# Patient Record
Sex: MALE | Race: OTHER | NOT HISPANIC OR LATINO | Employment: FULL TIME | URBAN - METROPOLITAN AREA
[De-identification: names, ages, dates, MRNs, and addresses within clinical notes are randomized per-mention and may not be internally consistent; named-entity substitution may affect disease eponyms.]

---

## 2020-02-12 ENCOUNTER — OFFICE VISIT (OUTPATIENT)
Dept: URGENT CARE | Facility: MEDICAL CENTER | Age: 40
End: 2020-02-12
Payer: COMMERCIAL

## 2020-02-12 VITALS
WEIGHT: 140 LBS | SYSTOLIC BLOOD PRESSURE: 110 MMHG | DIASTOLIC BLOOD PRESSURE: 78 MMHG | TEMPERATURE: 101.7 F | RESPIRATION RATE: 14 BRPM | HEART RATE: 110 BPM | OXYGEN SATURATION: 97 %

## 2020-02-12 DIAGNOSIS — R68.89 FLU-LIKE SYMPTOMS: ICD-10-CM

## 2020-02-12 DIAGNOSIS — R50.9 FEVER, UNSPECIFIED FEVER CAUSE: ICD-10-CM

## 2020-02-12 DIAGNOSIS — J98.8 RTI (RESPIRATORY TRACT INFECTION): ICD-10-CM

## 2020-02-12 LAB
FLUAV+FLUBV AG SPEC QL IA: NORMAL
INT CON NEG: NORMAL
INT CON POS: NORMAL

## 2020-02-12 PROCEDURE — 87804 INFLUENZA ASSAY W/OPTIC: CPT | Performed by: NURSE PRACTITIONER

## 2020-02-12 PROCEDURE — 99204 OFFICE O/P NEW MOD 45 MIN: CPT | Performed by: NURSE PRACTITIONER

## 2020-02-12 RX ORDER — OSELTAMIVIR PHOSPHATE 75 MG/1
75 CAPSULE ORAL 2 TIMES DAILY
Qty: 10 CAP | Refills: 0 | Status: SHIPPED | OUTPATIENT
Start: 2020-02-12 | End: 2020-02-17

## 2020-02-12 ASSESSMENT — ENCOUNTER SYMPTOMS
SORE THROAT: 1
SHORTNESS OF BREATH: 1
CARDIOVASCULAR NEGATIVE: 1
MYALGIAS: 1
FEVER: 1
CHILLS: 1
COUGH: 1

## 2020-02-13 NOTE — PROGRESS NOTES
Subjective:     Rui Garcia is a 29 y.o. male who presents for Influenza (fever, headaches , chills , )       Pharyngitis    This is a new problem. The current episode started yesterday. The problem has been gradually worsening. Associated symptoms include congestion, coughing and shortness of breath. Associated symptoms comments: Body aches. Treatments tried: Mucinex.     Reports having had influenza 4 times this season.  Has had his flu shot this season.    PMH:  has no past medical history on file.    MEDS:   Current Outpatient Medications:   •  oseltamivir (TAMIFLU) 75 MG Cap, Take 1 Cap by mouth 2 times a day for 5 days., Disp: 10 Cap, Rfl: 0    ALLERGIES: Not on File    SURGHX: History reviewed. No pertinent surgical history.    SOCHX:  reports that he has never smoked. He has never used smokeless tobacco.     FH: Reviewed with patient, not pertinent to this visit.    Review of Systems   Constitutional: Positive for chills, fever and malaise/fatigue.   HENT: Positive for congestion and sore throat.    Respiratory: Positive for cough and shortness of breath.    Cardiovascular: Negative.    Musculoskeletal: Positive for myalgias.   All other systems reviewed and are negative.    Additional details per HPI.    Objective:     /78   Pulse (!) 110   Temp (!) 38.7 °C (101.7 °F) (Temporal)   Resp 14   Wt 63.5 kg (140 lb)   SpO2 97%     Physical Exam  Vitals signs reviewed.   Constitutional:       General: He is not in acute distress.     Appearance: He is well-developed. He is ill-appearing. He is not toxic-appearing or diaphoretic.   HENT:      Head: Normocephalic.      Right Ear: Tympanic membrane and external ear normal.      Left Ear: Tympanic membrane and external ear normal.      Nose: Mucosal edema and rhinorrhea present.      Mouth/Throat:      Mouth: Mucous membranes are moist.      Pharynx: Uvula midline. Pharyngeal swelling and posterior oropharyngeal erythema present. No oropharyngeal  exudate.   Eyes:      Extraocular Movements: Extraocular movements intact.      Conjunctiva/sclera: Conjunctivae normal.      Pupils: Pupils are equal, round, and reactive to light.   Neck:      Musculoskeletal: Normal range of motion.   Cardiovascular:      Rate and Rhythm: Regular rhythm. Tachycardia present.      Pulses: Normal pulses.      Heart sounds: Normal heart sounds.   Pulmonary:      Effort: Pulmonary effort is normal. No respiratory distress.      Breath sounds: Normal breath sounds. No decreased breath sounds, wheezing, rhonchi or rales.   Abdominal:      General: Bowel sounds are normal.      Palpations: Abdomen is soft.      Tenderness: There is no abdominal tenderness.   Musculoskeletal: Normal range of motion.         General: No deformity.   Skin:     General: Skin is warm and dry.      Capillary Refill: Capillary refill takes less than 2 seconds.      Coloration: Skin is not pale.   Neurological:      Mental Status: He is alert and oriented to person, place, and time.      Sensory: No sensory deficit.      Coordination: Coordination normal.   Psychiatric:         Behavior: Behavior normal. Behavior is cooperative.       Influenza A/B swab: negative       Assessment/Plan:     1. Fever, unspecified fever cause  - POCT Influenza A/B    2. Flu-like symptoms  - oseltamivir (TAMIFLU) 75 MG Cap; Take 1 Cap by mouth 2 times a day for 5 days.  Dispense: 10 Cap; Refill: 0    3. RTI (respiratory tract infection)  - oseltamivir (TAMIFLU) 75 MG Cap; Take 1 Cap by mouth 2 times a day for 5 days.  Dispense: 10 Cap; Refill: 0    Negative influenza A/B swab.  Patient later discloses that he had leftover Tamiflu from previous episodes of flu and has been taking them.  Possible false negative swab.  Flulike symptoms.  Continue empiric treatment.  Rx sent for full duration.    Discussed close monitoring and supportive measures including increasing fluids and rest as well as OTC symptom management per 's  instructions.    Warning signs reviewed. Return precautions discussed.    Warning signs reviewed. Return precautions discussed.     Patient advised to: Return for 1) Symptoms don't improve or worsen, or go to ER, 2) Follow up with primary care in 7-10 days.    Differential diagnosis, natural history, supportive care, and indications for immediate follow-up discussed. All questions answered. Patient agrees with the plan of care.

## 2023-10-01 ENCOUNTER — APPOINTMENT (OUTPATIENT)
Dept: URGENT CARE | Facility: CLINIC | Age: 43
End: 2023-10-01
Payer: COMMERCIAL

## 2023-10-02 ENCOUNTER — OFFICE VISIT (OUTPATIENT)
Dept: URGENT CARE | Facility: CLINIC | Age: 43
End: 2023-10-02
Payer: COMMERCIAL

## 2023-10-02 VITALS
OXYGEN SATURATION: 96 % | HEIGHT: 71 IN | DIASTOLIC BLOOD PRESSURE: 82 MMHG | RESPIRATION RATE: 12 BRPM | TEMPERATURE: 97.9 F | BODY MASS INDEX: 23.6 KG/M2 | SYSTOLIC BLOOD PRESSURE: 116 MMHG | HEART RATE: 68 BPM | WEIGHT: 168.6 LBS

## 2023-10-02 DIAGNOSIS — J06.9 BACTERIAL UPPER RESPIRATORY INFECTION: ICD-10-CM

## 2023-10-02 DIAGNOSIS — J02.9 PHARYNGITIS, UNSPECIFIED ETIOLOGY: ICD-10-CM

## 2023-10-02 DIAGNOSIS — H66.002 NON-RECURRENT ACUTE SUPPURATIVE OTITIS MEDIA OF LEFT EAR WITHOUT SPONTANEOUS RUPTURE OF TYMPANIC MEMBRANE: ICD-10-CM

## 2023-10-02 DIAGNOSIS — B96.89 BACTERIAL UPPER RESPIRATORY INFECTION: ICD-10-CM

## 2023-10-02 PROCEDURE — 3074F SYST BP LT 130 MM HG: CPT | Performed by: PHYSICIAN ASSISTANT

## 2023-10-02 PROCEDURE — 3079F DIAST BP 80-89 MM HG: CPT | Performed by: PHYSICIAN ASSISTANT

## 2023-10-02 PROCEDURE — 99203 OFFICE O/P NEW LOW 30 MIN: CPT | Performed by: PHYSICIAN ASSISTANT

## 2023-10-02 RX ORDER — AMOXICILLIN AND CLAVULANATE POTASSIUM 875; 125 MG/1; MG/1
1 TABLET, FILM COATED ORAL 2 TIMES DAILY
Qty: 20 TABLET | Refills: 0 | Status: SHIPPED | OUTPATIENT
Start: 2023-10-02 | End: 2023-10-12

## 2023-10-02 ASSESSMENT — ENCOUNTER SYMPTOMS
COUGH: 1
FEVER: 0
WHEEZING: 0
SHORTNESS OF BREATH: 0
SPUTUM PRODUCTION: 1
PALPITATIONS: 0
CHILLS: 0
SORE THROAT: 1

## 2023-10-02 NOTE — PROGRESS NOTES
"  Subjective:   Rui Garcia is a 43 y.o. male who presents today with   Chief Complaint   Patient presents with    Nasal Congestion     X10 days, Sore throat, ear fullness, chest congestion/ pressure that started yesterday.\"     Other  This is a new problem. Episode onset: 10 days. The problem occurs constantly. The problem has been unchanged. Associated symptoms include congestion, coughing and a sore throat. Pertinent negatives include no chest pain, chills or fever. Treatments tried: theraflu. The treatment provided mild relief.     Patient states his wife recently had similar symptoms and had a throat culture that came back positive for H influenza this past week.    PMH:  has no past medical history on file.  MEDS:   Current Outpatient Medications:     amoxicillin-clavulanate (AUGMENTIN) 875-125 MG Tab, Take 1 Tablet by mouth 2 times a day for 10 days., Disp: 20 Tablet, Rfl: 0  ALLERGIES: No Known Allergies  SURGHX: No past surgical history on file.  SOCHX:  reports that he has never smoked. He has never used smokeless tobacco.  FH: Reviewed with patient, not pertinent to this visit.     Review of Systems   Constitutional:  Negative for chills and fever.   HENT:  Positive for congestion, ear pain and sore throat.         Ear fullness   Respiratory:  Positive for cough and sputum production. Negative for shortness of breath and wheezing.    Cardiovascular:  Negative for chest pain and palpitations.      Objective:   /82 (BP Location: Right arm, Patient Position: Sitting, BP Cuff Size: Adult)   Pulse 68   Temp 36.6 °C (97.9 °F) (Temporal)   Resp 12   Ht 1.803 m (5' 11\")   Wt 76.5 kg (168 lb 9.6 oz)   SpO2 96%   BMI 23.51 kg/m²   Physical Exam  Vitals and nursing note reviewed.   Constitutional:       General: He is not in acute distress.     Appearance: Normal appearance. He is well-developed. He is not ill-appearing or toxic-appearing.   HENT:      Head: Normocephalic and atraumatic.      Right " Ear: Hearing, tympanic membrane and ear canal normal.      Left Ear: Hearing and ear canal normal. A middle ear effusion is present. Tympanic membrane is erythematous.      Mouth/Throat:      Mouth: Mucous membranes are moist.      Pharynx: Uvula midline. Posterior oropharyngeal erythema present. No oropharyngeal exudate or uvula swelling.      Tonsils: No tonsillar exudate or tonsillar abscesses.   Cardiovascular:      Rate and Rhythm: Normal rate and regular rhythm.      Heart sounds: Normal heart sounds.   Pulmonary:      Effort: Pulmonary effort is normal. No respiratory distress.      Breath sounds: Normal breath sounds. No stridor. No wheezing, rhonchi or rales.   Musculoskeletal:      Comments: Normal movement in all 4 extremities   Skin:     General: Skin is warm and dry.   Neurological:      Mental Status: He is alert.      Coordination: Coordination normal.   Psychiatric:         Mood and Affect: Mood normal.           Assessment/Plan:   Assessment    1. Non-recurrent acute suppurative otitis media of left ear without spontaneous rupture of tympanic membrane  - amoxicillin-clavulanate (AUGMENTIN) 875-125 MG Tab; Take 1 Tablet by mouth 2 times a day for 10 days.  Dispense: 20 Tablet; Refill: 0    2. Pharyngitis, unspecified etiology    3. Bacterial upper respiratory infection    Patient symptoms and presentation consistent with left-sided ear infection which we will treat accordingly with antibiotics.  We will use a 10-day course given that he has had recent exposure to H. influenzae pharyngitis and we will cover for this as well and defer strep testing or throat culture at this time.  Patient also has symptoms of bacterial upper respiratory infection which will be covered with antibiotics as prescribed also.  Would recommend using over-the-counter Mucinex per 's instructions to help alleviate symptoms.      Differential diagnosis, natural history, supportive care, and indications for immediate  follow-up discussed.   Patient given instructions and understanding of medications and treatment.    If not improving in 3-5 days, F/U with PCP or return to UC if symptoms worsen.    Patient agreeable to plan.      Please note that this dictation was created using voice recognition software. I have made every reasonable attempt to correct obvious errors, but I expect that there are errors of grammar and possibly content that I did not discover before finalizing the note.    Armando Hernandez PA-C

## 2023-10-03 DIAGNOSIS — J02.9 SORE THROAT: ICD-10-CM

## 2023-10-03 RX ORDER — LIDOCAINE HYDROCHLORIDE 20 MG/ML
SOLUTION OROPHARYNGEAL
Qty: 100 ML | Refills: 0 | Status: SHIPPED | OUTPATIENT
Start: 2023-10-03

## 2023-10-09 ENCOUNTER — OFFICE VISIT (OUTPATIENT)
Dept: URGENT CARE | Facility: CLINIC | Age: 43
End: 2023-10-09
Payer: COMMERCIAL

## 2023-10-09 ENCOUNTER — HOSPITAL ENCOUNTER (OUTPATIENT)
Facility: MEDICAL CENTER | Age: 43
End: 2023-10-09
Attending: NURSE PRACTITIONER
Payer: COMMERCIAL

## 2023-10-09 VITALS
BODY MASS INDEX: 23.66 KG/M2 | TEMPERATURE: 97.7 F | HEART RATE: 76 BPM | HEIGHT: 71 IN | OXYGEN SATURATION: 97 % | DIASTOLIC BLOOD PRESSURE: 76 MMHG | RESPIRATION RATE: 16 BRPM | WEIGHT: 169 LBS | SYSTOLIC BLOOD PRESSURE: 116 MMHG

## 2023-10-09 DIAGNOSIS — Z20.9 EXPOSURE TO COMMUNICABLE DISEASE: ICD-10-CM

## 2023-10-09 DIAGNOSIS — J02.9 SORE THROAT: ICD-10-CM

## 2023-10-09 PROCEDURE — 3078F DIAST BP <80 MM HG: CPT | Performed by: NURSE PRACTITIONER

## 2023-10-09 PROCEDURE — 3074F SYST BP LT 130 MM HG: CPT | Performed by: NURSE PRACTITIONER

## 2023-10-09 PROCEDURE — 87070 CULTURE OTHR SPECIMN AEROBIC: CPT

## 2023-10-09 PROCEDURE — 99214 OFFICE O/P EST MOD 30 MIN: CPT | Performed by: NURSE PRACTITIONER

## 2023-10-09 RX ORDER — FLUTICASONE PROPIONATE 50 MCG
2 SPRAY, SUSPENSION (ML) NASAL DAILY
Qty: 16 G | Refills: 0 | Status: SHIPPED | OUTPATIENT
Start: 2023-10-09

## 2023-10-10 DIAGNOSIS — J02.9 SORE THROAT: ICD-10-CM

## 2023-10-10 DIAGNOSIS — Z20.9 EXPOSURE TO COMMUNICABLE DISEASE: ICD-10-CM

## 2023-10-10 NOTE — PROGRESS NOTES
"Rui Garcia is a 43 y.o. male who presents for Ear Fullness (Right ), Sinusitis, Cough (W/dark yellow phlegm), Pharyngitis, and Fatigue (W/headaches)      HPI  This is a new problem. Rui Garcia is a 43 y.o. patient who presents to urgent care with c/o:  3 weeks of sore throat in the morning. Coughing during the night. Headaches, ears feel congested and fatigue.  Has done multiple tests for covid that were all negative. His wife tested positive for H-flu  on throat culture and was treated with antibiotic.  He was seen in urgent care on 10/02/23.  He is currently taking  10 day RX of Augmentin for acute otitis media.  He did give his wife a few days of his Augmentin. He believes there is resistance to augmentin in the community and needs \"stronger antibiotic\".   Using nasal spray when he wants to breath well. These symptoms are interfering with his job and life.   He is not taking Lidocaine gargle for his throat pain.         ROS See HPI    Allergies:     No Known Allergies    PMSFS Hx:  No past medical history on file.  No past surgical history on file.  No family history on file.  Social History     Tobacco Use    Smoking status: Never    Smokeless tobacco: Never   Substance Use Topics    Alcohol use: Not on file       Problems:   There is no problem list on file for this patient.      Medications:   Current Outpatient Medications on File Prior to Visit   Medication Sig Dispense Refill    amoxicillin-clavulanate (AUGMENTIN) 875-125 MG Tab Take 1 Tablet by mouth 2 times a day for 10 days. 20 Tablet 0    lidocaine (XYLOCAINE) 2 % Solution Gargle and spit 5mL every 6 hours as needed for sore throat. (Patient not taking: Reported on 10/9/2023) 100 mL 0     No current facility-administered medications on file prior to visit.          Objective:     /76 (BP Location: Left arm, Patient Position: Sitting, BP Cuff Size: Adult long)   Pulse 76   Temp 36.5 °C (97.7 °F) (Temporal)   Resp 16   Ht 1.803 m (5' " "11\") Comment: per pt  Wt 76.7 kg (169 lb)   SpO2 97%   BMI 23.57 kg/m²     Physical Exam  Vitals and nursing note reviewed.   Constitutional:       General: He is not in acute distress.     Appearance: Normal appearance. He is well-developed. He is not ill-appearing or toxic-appearing.   HENT:      Head: Normocephalic.      Right Ear: Hearing, tympanic membrane, ear canal and external ear normal.      Left Ear: Hearing, tympanic membrane, ear canal and external ear normal.      Nose: Mucosal edema and rhinorrhea (clear) present.      Right Sinus: No maxillary sinus tenderness or frontal sinus tenderness.      Left Sinus: No maxillary sinus tenderness or frontal sinus tenderness.      Mouth/Throat:      Mouth: Mucous membranes are moist.      Pharynx: Uvula midline. Posterior oropharyngeal erythema (mild) present. No oropharyngeal exudate or uvula swelling.      Tonsils: No tonsillar abscesses.   Eyes:      General: Lids are normal.      Conjunctiva/sclera: Conjunctivae normal.   Neck:      Trachea: Trachea and phonation normal.   Cardiovascular:      Rate and Rhythm: Normal rate and regular rhythm.      Pulses: Normal pulses.      Heart sounds: Normal heart sounds.   Pulmonary:      Effort: Pulmonary effort is normal.      Breath sounds: Normal breath sounds.   Musculoskeletal:      Cervical back: Full passive range of motion without pain, normal range of motion and neck supple. No muscular tenderness. Normal range of motion.   Lymphadenopathy:      Cervical: No cervical adenopathy.      Upper Body:      Right upper body: No supraclavicular adenopathy.      Left upper body: No supraclavicular adenopathy.   Skin:     General: Skin is warm and dry.      Capillary Refill: Capillary refill takes less than 2 seconds.   Neurological:      Mental Status: He is alert and oriented to person, place, and time.   Psychiatric:         Mood and Affect: Mood normal.         Behavior: Behavior normal. Behavior is cooperative.    "      Thought Content: Thought content normal.           Assessment /Associated Orders:      1. Sore throat  CULTURE THROAT      2. Exposure to communicable disease  CULTURE THROAT    fluticasone (FLONASE) 50 MCG/ACT nasal spray            Medical Decision Making:    Rui is a very pleasant 43 y.o. male who is clinically stable at today's acute urgent care visit.  No acute distress noted.  VSS. Appropriate for outpatient care at this time.   Acute problem today with uncertain prognosis.     Throat culture: pending - will treat based on results of culture.   Declines COVID, influenza, RSV viral resp PCR testing today. Advised that symptoms could be consistent with known viral infections. Verbalized understanding.     Salt water gargles BID and prn. Suggested 1/4 to 1/2 teaspoon (1.5 to 3.0 g) of salt per one cup (8 ounces or 250 mL) of warm water.   OTC throat analgesic spray or lozenge of choice prn throat pain. Dosage and directions per   OTC  analgesic of choice (acetaminophen or NSAID) prn pain. Follow manufactures dosing and safety precautions.   Stay well hydrated.   Educated in infection control practices.     Discussed Dx, management options (risks,benefits, and alternatives to planned treatment), natural progression and supportive care.  Expressed understanding and the treatment plan was agreed upon.   Questions were encouraged and answered   Return to urgent care prn if new or worsening sx or if there is no improvement in condition prn.    Educated in Red flags and indications to immediately call 911 or present to the Emergency Department.       Time I spent evaluating Rui Garcia in urgent care today was 32  minutes. Greater than 50 % of this visit was spent face to face with pt in counseling and coordination of care.  This time includes preparing for visit, reviewing any pertinent notes or test results, counseling/education, exam, obtaining HPI, interpretation of lab tests, medication  management and documentation as indicated above.Time does not include separately billable procedures noted .       Please note that this dictation was created using voice recognition software. I have worked with consultants from the vendor as well as technical experts from [facility] [employer] to optimize the interface. I have made every reasonable attempt to correct obvious errors, but I expect that there are errors of grammar and possibly content that I did not discover before finalizing the note.  This note was electronically signed by provider

## 2023-10-13 LAB
BACTERIA SPEC RESP CULT: NORMAL
SIGNIFICANT IND 70042: NORMAL
SITE SITE: NORMAL
SOURCE SOURCE: NORMAL

## 2023-10-18 ENCOUNTER — APPOINTMENT (OUTPATIENT)
Dept: MEDICAL GROUP | Facility: MEDICAL CENTER | Age: 43
End: 2023-10-18
Payer: COMMERCIAL

## 2024-07-03 ENCOUNTER — OFFICE VISIT (OUTPATIENT)
Dept: MEDICAL GROUP | Facility: MEDICAL CENTER | Age: 44
End: 2024-07-03
Payer: COMMERCIAL

## 2024-07-03 VITALS
WEIGHT: 173.5 LBS | HEART RATE: 61 BPM | DIASTOLIC BLOOD PRESSURE: 86 MMHG | BODY MASS INDEX: 24.29 KG/M2 | OXYGEN SATURATION: 98 % | TEMPERATURE: 97.2 F | HEIGHT: 71 IN | SYSTOLIC BLOOD PRESSURE: 112 MMHG

## 2024-07-03 DIAGNOSIS — Z11.3 ROUTINE SCREENING FOR STI (SEXUALLY TRANSMITTED INFECTION): ICD-10-CM

## 2024-07-03 DIAGNOSIS — E78.5 DYSLIPIDEMIA: ICD-10-CM

## 2024-07-03 DIAGNOSIS — Z13.228 SCREENING FOR METABOLIC DISORDER: ICD-10-CM

## 2024-07-03 DIAGNOSIS — Z11.59 ENCOUNTER FOR HEPATITIS C SCREENING TEST FOR LOW RISK PATIENT: ICD-10-CM

## 2024-07-03 DIAGNOSIS — R53.83 FATIGUE, UNSPECIFIED TYPE: ICD-10-CM

## 2024-07-03 PROCEDURE — 3074F SYST BP LT 130 MM HG: CPT | Performed by: STUDENT IN AN ORGANIZED HEALTH CARE EDUCATION/TRAINING PROGRAM

## 2024-07-03 PROCEDURE — 99214 OFFICE O/P EST MOD 30 MIN: CPT | Performed by: STUDENT IN AN ORGANIZED HEALTH CARE EDUCATION/TRAINING PROGRAM

## 2024-07-03 PROCEDURE — 3079F DIAST BP 80-89 MM HG: CPT | Performed by: STUDENT IN AN ORGANIZED HEALTH CARE EDUCATION/TRAINING PROGRAM

## 2024-07-03 ASSESSMENT — ENCOUNTER SYMPTOMS
COUGH: 0
WEIGHT LOSS: 0
VOMITING: 0
HEARTBURN: 0
PALPITATIONS: 0
CONSTIPATION: 0
BLOOD IN STOOL: 0
CHILLS: 0
DIAPHORESIS: 0
WHEEZING: 0
ABDOMINAL PAIN: 0
FOCAL WEAKNESS: 0
HEADACHES: 0
DIZZINESS: 1
DIARRHEA: 0
SPUTUM PRODUCTION: 0
FEVER: 0
CLAUDICATION: 0
PND: 0
SHORTNESS OF BREATH: 0
ORTHOPNEA: 0
FLANK PAIN: 0
HEMOPTYSIS: 0
NAUSEA: 0

## 2024-07-03 ASSESSMENT — PATIENT HEALTH QUESTIONNAIRE - PHQ9: CLINICAL INTERPRETATION OF PHQ2 SCORE: 0

## 2024-09-08 ENCOUNTER — APPOINTMENT (OUTPATIENT)
Dept: URGENT CARE | Facility: CLINIC | Age: 44
End: 2024-09-08
Payer: COMMERCIAL

## 2024-09-08 ENCOUNTER — APPOINTMENT (OUTPATIENT)
Dept: URGENT CARE | Facility: PHYSICIAN GROUP | Age: 44
End: 2024-09-08
Payer: COMMERCIAL

## 2024-09-09 ENCOUNTER — OFFICE VISIT (OUTPATIENT)
Dept: URGENT CARE | Facility: CLINIC | Age: 44
End: 2024-09-09
Payer: COMMERCIAL

## 2024-09-09 VITALS
BODY MASS INDEX: 23.91 KG/M2 | OXYGEN SATURATION: 100 % | SYSTOLIC BLOOD PRESSURE: 110 MMHG | TEMPERATURE: 98.1 F | HEART RATE: 84 BPM | DIASTOLIC BLOOD PRESSURE: 74 MMHG | WEIGHT: 167 LBS | RESPIRATION RATE: 16 BRPM | HEIGHT: 70 IN

## 2024-09-09 DIAGNOSIS — J02.9 PHARYNGITIS, UNSPECIFIED ETIOLOGY: ICD-10-CM

## 2024-09-09 LAB — S PYO DNA SPEC NAA+PROBE: NOT DETECTED

## 2024-09-09 PROCEDURE — 3078F DIAST BP <80 MM HG: CPT

## 2024-09-09 PROCEDURE — 87651 STREP A DNA AMP PROBE: CPT

## 2024-09-09 PROCEDURE — 99213 OFFICE O/P EST LOW 20 MIN: CPT

## 2024-09-09 PROCEDURE — 0241U POCT CEPHEID COV-2, FLU A/B, RSV - PCR: CPT

## 2024-09-09 PROCEDURE — 3074F SYST BP LT 130 MM HG: CPT

## 2024-09-09 RX ORDER — LIDOCAINE HYDROCHLORIDE 20 MG/ML
SOLUTION OROPHARYNGEAL
Qty: 100 ML | Refills: 0 | Status: SHIPPED | OUTPATIENT
Start: 2024-09-09

## 2024-09-09 ASSESSMENT — ENCOUNTER SYMPTOMS
DIARRHEA: 0
ABDOMINAL PAIN: 0
BACK PAIN: 0
NECK PAIN: 0
SWOLLEN GLANDS: 1
CHILLS: 0
WEIGHT LOSS: 0
STRIDOR: 0
HEADACHES: 1
FEVER: 0
EYE PAIN: 0
SHORTNESS OF BREATH: 0
VOMITING: 0
COUGH: 0
HOARSE VOICE: 0
SINUS PAIN: 1
SORE THROAT: 1
TROUBLE SWALLOWING: 1
EYE DISCHARGE: 0
MYALGIAS: 0

## 2024-09-10 LAB
FLUAV RNA SPEC QL NAA+PROBE: NEGATIVE
FLUBV RNA SPEC QL NAA+PROBE: NEGATIVE
RSV RNA SPEC QL NAA+PROBE: NEGATIVE
SARS-COV-2 RNA RESP QL NAA+PROBE: POSITIVE

## 2024-09-10 NOTE — PROGRESS NOTES
"Subjective:   Rui Garcia is a 43 y.o. male who presents for Pharyngitis (Fever x3d)      Patient presents with complaints of pharyngitis, chest congestion, and fever for the last 3 days.  States he works at [employer] is exposed to many sick contacts and is not certain what exactly he has been exposed to.  Denies any difficulty breathing, shortness of breath, or chest pain    Pharyngitis   This is a new problem. The current episode started in the past 7 days. The problem has been gradually worsening. Neither side of throat is experiencing more pain than the other. The pain is at a severity of 5/10. The pain is moderate. Associated symptoms include congestion, headaches, swollen glands and trouble swallowing. Pertinent negatives include no abdominal pain, coughing, diarrhea, drooling, ear discharge, ear pain, hoarse voice, plugged ear sensation, neck pain, shortness of breath, stridor or vomiting. He has tried acetaminophen for the symptoms. The treatment provided mild relief.       Review of Systems   Constitutional:  Negative for chills, fever, malaise/fatigue and weight loss.   HENT:  Positive for congestion, sinus pain, sore throat and trouble swallowing. Negative for drooling, ear discharge, ear pain and hoarse voice.    Eyes:  Negative for pain and discharge.   Respiratory:  Negative for cough, shortness of breath and stridor.    Cardiovascular:  Negative for chest pain.   Gastrointestinal:  Negative for abdominal pain, diarrhea and vomiting.   Musculoskeletal:  Negative for back pain, joint pain, myalgias and neck pain.   Skin:  Negative for rash.   Neurological:  Positive for headaches.       Medications, Allergies, and current problem list reviewed today in Epic.     Objective:     /74   Pulse 84   Temp 36.7 °C (98.1 °F) (Temporal)   Resp 16   Ht 1.778 m (5' 10\")   Wt 75.8 kg (167 lb)   SpO2 100%     Physical Exam  Vitals reviewed.   Constitutional:       General: He is not in acute distress.     " Appearance: Normal appearance. He is normal weight. He is not ill-appearing.   HENT:      Head: Normocephalic and atraumatic.      Right Ear: Tympanic membrane, ear canal and external ear normal.      Left Ear: Tympanic membrane, ear canal and external ear normal.      Nose: Nose normal. No congestion or rhinorrhea.      Mouth/Throat:      Mouth: Mucous membranes are moist.      Pharynx: Oropharyngeal exudate and posterior oropharyngeal erythema present.   Eyes:      General:         Right eye: No discharge.         Left eye: No discharge.   Cardiovascular:      Rate and Rhythm: Normal rate.      Pulses: Normal pulses.   Pulmonary:      Effort: Pulmonary effort is normal.      Breath sounds: Normal breath sounds.   Musculoskeletal:      Cervical back: Normal range of motion and neck supple. Tenderness present. No rigidity.   Lymphadenopathy:      Cervical: Cervical adenopathy present.   Neurological:      Mental Status: He is alert.         Assessment/Plan:     Diagnosis and associated orders:     1. Pharyngitis, unspecified etiology  POCT CoV-2, Flu A/B, RSV by PCR    POCT CEPHEID GROUP A STREP - PCR    lidocaine (XYLOCAINE) 2 % Solution         Comments/MDM:     Patient's history and physical exam are consistent with an acute pharyngitis, though etiology is not certain at this point.  Given patient's symptoms and duration as well as exposure to multiple young sick contacts we will rule out strep as well as viral swab  Viral swab positive covid  Strep swab negative  Viscous lidocaine rinse gargle swish and spit for throat pain as needed  Increase hydration with low sugar low sodium fluids  Tylenol/ibuprofen as staggered schedule for fever and pain         Differential diagnosis, natural history, supportive care, and indications for immediate follow-up discussed.    Advised the patient to follow-up with the primary care physician for recheck, reevaluation, and consideration of further management.    Please note that  this dictation was created using voice recognition software. I have made a reasonable attempt to correct obvious errors, but I expect that there are errors of grammar and possibly content that I did not discover before finalizing the note.    This note was electronically signed by RANDA Zazueta

## 2024-09-12 ENCOUNTER — TELEPHONE (OUTPATIENT)
Dept: URGENT CARE | Facility: PHYSICIAN GROUP | Age: 44
End: 2024-09-12
Payer: COMMERCIAL

## 2024-09-12 DIAGNOSIS — U07.1 LAB TEST POSITIVE FOR DETECTION OF COVID-19 VIRUS: ICD-10-CM

## 2024-09-12 NOTE — TELEPHONE ENCOUNTER
Telephone Appointment Visit   This telephone visit was initiated by the patient and they verbally consented.    Reason for Call:  Lab Follow-up    HPI:    Fever, chills, body aches    Labs / Images Reviewed:   Viral panel: COVID-positive    Assessment and Plan:     1. Lab test positive for detection of COVID-19 virus      Follow-up: Patient is a good candidate for Paxlovid, is interested because he is at work and constantly exposed and would like to be able to go back to work.  Discussed typical duration of symptoms as well as inclusion exclusion criteria for Paxlovid treatment.  Patient does not take any medications daily and has no contraindications  Paxlovid prescribed  Continue increase hydration, Tylenol/NSAIDs, and viscous lidocaine rinse swish gargle and spit as previously discussed in clinic    Total Time Spent (mins): 10    RANDA Zazueta

## 2024-10-02 ENCOUNTER — PATIENT MESSAGE (OUTPATIENT)
Dept: MEDICAL GROUP | Facility: MEDICAL CENTER | Age: 44
End: 2024-10-02
Payer: COMMERCIAL

## 2024-10-09 ENCOUNTER — TELEMEDICINE (OUTPATIENT)
Dept: MEDICAL GROUP | Facility: MEDICAL CENTER | Age: 44
End: 2024-10-09
Payer: COMMERCIAL

## 2024-10-09 VITALS — BODY MASS INDEX: 23.91 KG/M2 | HEIGHT: 70 IN | WEIGHT: 167 LBS

## 2024-10-09 DIAGNOSIS — F33.1 MODERATE EPISODE OF RECURRENT MAJOR DEPRESSIVE DISORDER (HCC): ICD-10-CM

## 2024-10-09 PROCEDURE — 99213 OFFICE O/P EST LOW 20 MIN: CPT | Performed by: STUDENT IN AN ORGANIZED HEALTH CARE EDUCATION/TRAINING PROGRAM

## 2024-10-09 RX ORDER — FLUOXETINE 10 MG/1
5 TABLET, FILM COATED ORAL DAILY
Qty: 45 TABLET | Refills: 3 | Status: SHIPPED | OUTPATIENT
Start: 2024-10-09

## 2024-10-09 ASSESSMENT — PATIENT HEALTH QUESTIONNAIRE - PHQ9
5. POOR APPETITE OR OVEREATING: 0 - NOT AT ALL
SUM OF ALL RESPONSES TO PHQ QUESTIONS 1-9: 12
CLINICAL INTERPRETATION OF PHQ2 SCORE: 4

## 2025-03-30 ENCOUNTER — APPOINTMENT (OUTPATIENT)
Dept: RADIOLOGY | Facility: MEDICAL CENTER | Age: 45
End: 2025-03-30
Attending: EMERGENCY MEDICINE
Payer: COMMERCIAL

## 2025-03-30 ENCOUNTER — HOSPITAL ENCOUNTER (EMERGENCY)
Facility: MEDICAL CENTER | Age: 45
End: 2025-03-30
Attending: EMERGENCY MEDICINE
Payer: COMMERCIAL

## 2025-03-30 VITALS
BODY MASS INDEX: 25.12 KG/M2 | HEIGHT: 70 IN | HEART RATE: 62 BPM | DIASTOLIC BLOOD PRESSURE: 79 MMHG | OXYGEN SATURATION: 93 % | SYSTOLIC BLOOD PRESSURE: 133 MMHG | RESPIRATION RATE: 16 BRPM | TEMPERATURE: 98.1 F | WEIGHT: 175.49 LBS

## 2025-03-30 DIAGNOSIS — N23 RENAL COLIC: ICD-10-CM

## 2025-03-30 LAB
ALBUMIN SERPL BCP-MCNC: 4.6 G/DL (ref 3.2–4.9)
ALBUMIN/GLOB SERPL: 1.7 G/DL
ALP SERPL-CCNC: 80 U/L (ref 30–99)
ALT SERPL-CCNC: 61 U/L (ref 2–50)
ANION GAP SERPL CALC-SCNC: 13 MMOL/L (ref 7–16)
APPEARANCE UR: CLEAR
AST SERPL-CCNC: 30 U/L (ref 12–45)
BACTERIA #/AREA URNS HPF: ABNORMAL /HPF
BASOPHILS # BLD AUTO: 0.7 % (ref 0–1.8)
BASOPHILS # BLD: 0.05 K/UL (ref 0–0.12)
BILIRUB SERPL-MCNC: 0.5 MG/DL (ref 0.1–1.5)
BILIRUB UR QL STRIP.AUTO: NEGATIVE
BUN SERPL-MCNC: 14 MG/DL (ref 8–22)
CALCIUM ALBUM COR SERPL-MCNC: 9.2 MG/DL (ref 8.5–10.5)
CALCIUM SERPL-MCNC: 9.7 MG/DL (ref 8.5–10.5)
CASTS URNS QL MICRO: ABNORMAL /LPF (ref 0–2)
CHLORIDE SERPL-SCNC: 104 MMOL/L (ref 96–112)
CO2 SERPL-SCNC: 22 MMOL/L (ref 20–33)
COLOR UR: YELLOW
CREAT SERPL-MCNC: 1.12 MG/DL (ref 0.5–1.4)
EOSINOPHIL # BLD AUTO: 0.16 K/UL (ref 0–0.51)
EOSINOPHIL NFR BLD: 2.4 % (ref 0–6.9)
EPITHELIAL CELLS 1715: ABNORMAL /HPF (ref 0–5)
ERYTHROCYTE [DISTWIDTH] IN BLOOD BY AUTOMATED COUNT: 38.6 FL (ref 35.9–50)
GFR SERPLBLD CREATININE-BSD FMLA CKD-EPI: 83 ML/MIN/1.73 M 2
GLOBULIN SER CALC-MCNC: 2.7 G/DL (ref 1.9–3.5)
GLUCOSE SERPL-MCNC: 99 MG/DL (ref 65–99)
GLUCOSE UR STRIP.AUTO-MCNC: NEGATIVE MG/DL
HCT VFR BLD AUTO: 48.6 % (ref 42–52)
HGB BLD-MCNC: 16.7 G/DL (ref 14–18)
IMM GRANULOCYTES # BLD AUTO: 0.02 K/UL (ref 0–0.11)
IMM GRANULOCYTES NFR BLD AUTO: 0.3 % (ref 0–0.9)
KETONES UR STRIP.AUTO-MCNC: NEGATIVE MG/DL
LEUKOCYTE ESTERASE UR QL STRIP.AUTO: NEGATIVE
LIPASE SERPL-CCNC: 32 U/L (ref 11–82)
LYMPHOCYTES # BLD AUTO: 2.56 K/UL (ref 1–4.8)
LYMPHOCYTES NFR BLD: 38.3 % (ref 22–41)
MCH RBC QN AUTO: 30 PG (ref 27–33)
MCHC RBC AUTO-ENTMCNC: 34.4 G/DL (ref 32.3–36.5)
MCV RBC AUTO: 87.3 FL (ref 81.4–97.8)
MICRO URNS: ABNORMAL
MONOCYTES # BLD AUTO: 0.53 K/UL (ref 0–0.85)
MONOCYTES NFR BLD AUTO: 7.9 % (ref 0–13.4)
MUCOUS THREADS URNS QL MICRO: PRESENT /HPF
NEUTROPHILS # BLD AUTO: 3.36 K/UL (ref 1.82–7.42)
NEUTROPHILS NFR BLD: 50.4 % (ref 44–72)
NITRITE UR QL STRIP.AUTO: NEGATIVE
NRBC # BLD AUTO: 0 K/UL
NRBC BLD-RTO: 0 /100 WBC (ref 0–0.2)
PH UR STRIP.AUTO: 6 [PH] (ref 5–8)
PLATELET # BLD AUTO: 206 K/UL (ref 164–446)
PMV BLD AUTO: 10.8 FL (ref 9–12.9)
POTASSIUM SERPL-SCNC: 4 MMOL/L (ref 3.6–5.5)
PROT SERPL-MCNC: 7.3 G/DL (ref 6–8.2)
PROT UR QL STRIP: NEGATIVE MG/DL
RBC # BLD AUTO: 5.57 M/UL (ref 4.7–6.1)
RBC # URNS HPF: ABNORMAL /HPF
RBC UR QL AUTO: ABNORMAL
SODIUM SERPL-SCNC: 139 MMOL/L (ref 135–145)
SP GR UR STRIP.AUTO: 1.02
UROBILINOGEN UR STRIP.AUTO-MCNC: 0.2 EU/DL
WBC # BLD AUTO: 6.7 K/UL (ref 4.8–10.8)
WBC #/AREA URNS HPF: ABNORMAL /HPF

## 2025-03-30 PROCEDURE — 81001 URINALYSIS AUTO W/SCOPE: CPT

## 2025-03-30 PROCEDURE — 80053 COMPREHEN METABOLIC PANEL: CPT

## 2025-03-30 PROCEDURE — 700111 HCHG RX REV CODE 636 W/ 250 OVERRIDE (IP): Mod: JZ | Performed by: EMERGENCY MEDICINE

## 2025-03-30 PROCEDURE — 96374 THER/PROPH/DIAG INJ IV PUSH: CPT

## 2025-03-30 PROCEDURE — 96376 TX/PRO/DX INJ SAME DRUG ADON: CPT

## 2025-03-30 PROCEDURE — 99285 EMERGENCY DEPT VISIT HI MDM: CPT

## 2025-03-30 PROCEDURE — 83690 ASSAY OF LIPASE: CPT

## 2025-03-30 PROCEDURE — 74176 CT ABD & PELVIS W/O CONTRAST: CPT

## 2025-03-30 PROCEDURE — 85025 COMPLETE CBC W/AUTO DIFF WBC: CPT

## 2025-03-30 PROCEDURE — 96375 TX/PRO/DX INJ NEW DRUG ADDON: CPT

## 2025-03-30 PROCEDURE — 700105 HCHG RX REV CODE 258: Performed by: EMERGENCY MEDICINE

## 2025-03-30 PROCEDURE — 36415 COLL VENOUS BLD VENIPUNCTURE: CPT

## 2025-03-30 RX ORDER — MORPHINE SULFATE 4 MG/ML
4 INJECTION INTRAVENOUS ONCE
Status: COMPLETED | OUTPATIENT
Start: 2025-03-30 | End: 2025-03-30

## 2025-03-30 RX ORDER — CETIRIZINE HYDROCHLORIDE 10 MG/1
10 TABLET ORAL DAILY
COMMUNITY

## 2025-03-30 RX ORDER — TAMSULOSIN HYDROCHLORIDE 0.4 MG/1
0.4 CAPSULE ORAL DAILY
Qty: 30 CAPSULE | Refills: 0 | Status: SHIPPED | OUTPATIENT
Start: 2025-03-30

## 2025-03-30 RX ORDER — HYDROCODONE BITARTRATE AND ACETAMINOPHEN 5; 325 MG/1; MG/1
1 TABLET ORAL EVERY 6 HOURS PRN
Qty: 12 TABLET | Refills: 0 | Status: SHIPPED | OUTPATIENT
Start: 2025-03-30 | End: 2025-04-02

## 2025-03-30 RX ORDER — ONDANSETRON 2 MG/ML
4 INJECTION INTRAMUSCULAR; INTRAVENOUS ONCE
Status: COMPLETED | OUTPATIENT
Start: 2025-03-30 | End: 2025-03-30

## 2025-03-30 RX ORDER — KETOROLAC TROMETHAMINE 15 MG/ML
15 INJECTION, SOLUTION INTRAMUSCULAR; INTRAVENOUS ONCE
Status: COMPLETED | OUTPATIENT
Start: 2025-03-30 | End: 2025-03-30

## 2025-03-30 RX ORDER — SODIUM CHLORIDE, SODIUM LACTATE, POTASSIUM CHLORIDE, CALCIUM CHLORIDE 600; 310; 30; 20 MG/100ML; MG/100ML; MG/100ML; MG/100ML
1000 INJECTION, SOLUTION INTRAVENOUS ONCE
Status: COMPLETED | OUTPATIENT
Start: 2025-03-30 | End: 2025-03-30

## 2025-03-30 RX ADMIN — MORPHINE SULFATE 4 MG: 4 INJECTION, SOLUTION INTRAMUSCULAR; INTRAVENOUS at 09:30

## 2025-03-30 RX ADMIN — KETOROLAC TROMETHAMINE 15 MG: 15 INJECTION, SOLUTION INTRAMUSCULAR; INTRAVENOUS at 07:00

## 2025-03-30 RX ADMIN — MORPHINE SULFATE 4 MG: 4 INJECTION, SOLUTION INTRAMUSCULAR; INTRAVENOUS at 06:59

## 2025-03-30 RX ADMIN — SODIUM CHLORIDE, POTASSIUM CHLORIDE, SODIUM LACTATE AND CALCIUM CHLORIDE 1000 ML: 600; 310; 30; 20 INJECTION, SOLUTION INTRAVENOUS at 08:26

## 2025-03-30 RX ADMIN — ONDANSETRON 4 MG: 2 INJECTION INTRAMUSCULAR; INTRAVENOUS at 06:59

## 2025-03-30 ASSESSMENT — PAIN DESCRIPTION - PAIN TYPE: TYPE: ACUTE PAIN

## 2025-03-30 NOTE — ED TRIAGE NOTES
"Chief Complaint   Patient presents with    LLQ Pain     Started suddenly 30min pta pt woke with sharp pain   Possible kidney stone per pt     Denies an blood in urine/pain is non radiating   BP (!) 136/111   Pulse 84   Temp 36.1 °C (97 °F) (Temporal)   Resp 20   Ht 1.778 m (5' 10\")   Wt 79.6 kg (175 lb 7.8 oz)   SpO2 98%   BMI 25.18 kg/m²     "

## 2025-03-30 NOTE — DISCHARGE INSTRUCTIONS
Rest, drink 20 of fluids.  Return to the emergency department for more pain, if you have fever, difficulty urinating or other concerns.  You can take ibuprofen 600 mg or 3 over-the-counter tablets 3 times a day for your pain.  Do not do this for more than a week.  May take Norco for breakthrough pain.  No driving on Norco.  Take Flomax as well until the stone passes.  Strain urine and collect stone and bring to urology.  Follow-up with urologist.  Follow-up with your doctor to review other results.

## 2025-03-30 NOTE — ED NOTES
Medicated for c/o pain 3/10. IVF continue to infuse. Warm pack given to pt per his request. Pt requesting to be discharged.

## 2025-03-30 NOTE — ED PROVIDER NOTES
ED Provider Note    CHIEF COMPLAINT  Chief Complaint   Patient presents with    LLQ Pain     Started suddenly 30min pta pt woke with sharp pain   Possible kidney stone per pt       EXTERNAL RECORDS REVIEWED  Reviewed our records.  No previous CT or labs for comparison.    HPI/ROS  LIMITATION TO HISTORY   History initially limited secondary to discomfort.  OUTSIDE HISTORIAN(S):  None.    Rui Garcia is a 44 y.o. male who presents to the emergency department complaining of left lower quadrant abdominal pain.  The patient reports abrupt onset of left lower quadrant pain about 30 minutes prior to coming in.  Severe and colicky.  Radiates towards his flank and groin.  It is intermittently severe and is having a hard time finding a position of comfort.  No nausea vomiting fevers or chills.  No diarrhea.  No dysuria or frequency.  Does have a history of kidney stones.    PAST MEDICAL HISTORY       SURGICAL HISTORY  patient denies any surgical history    FAMILY HISTORY  Family History   Problem Relation Age of Onset    Heart Disease Father         arrhythmia    Hypertension Father     Stroke Father     Cancer Neg Hx     Ovarian Cancer Neg Hx     Tubal Cancer Neg Hx     Breast Cancer Neg Hx     Colorectal Cancer Neg Hx     Peritoneal Cancer Neg Hx        SOCIAL HISTORY  Social History     Tobacco Use    Smoking status: Former     Current packs/day: 0.00     Average packs/day: 0.1 packs/day for 3.0 years (0.4 ttl pk-yrs)     Types: Cigarettes     Start date:      Quit date: 2020     Years since quittin.2    Smokeless tobacco: Never   Vaping Use    Vaping status: Never Used   Substance and Sexual Activity    Alcohol use: Yes     Comment: occa    Drug use: Not Currently    Sexual activity: Yes     Partners: Female       CURRENT MEDICATIONS  Home Medications    **Home medications have not yet been reviewed for this encounter**         ALLERGIES  No Known Allergies    PHYSICAL EXAM  VITAL SIGNS: BP (!) 136/111    "Pulse 84   Temp 36.1 °C (97 °F) (Temporal)   Resp 20   Ht 1.778 m (5' 10\")   Wt 79.6 kg (175 lb 7.8 oz)   SpO2 98%   BMI 25.18 kg/m²    Constitutional: Awake alert writhing in pain  HENT: Normocephalic, Atraumatic,  Eyes: PERRL, EOMI, Conjunctiva normal, No discharge.   Neck: Normal range of motion,   Cardiovascular: Normal heart rate, Normal rhythm, No murmurs, No rubs,  Thorax & Lungs: Normal breath sounds, No respiratory distress,  Abdomen: Soft nontender nondistended.  Skin: Warm, Dry, No erythema, No rash.   Back: No tenderness, No CVA tenderness.   Musculoskeletal: Good range of motion in all major joints.  No asymmetric edema, good pulses.  Neurologic: Alert, No focal deficits noted.   Psychiatric: Affect normal    EKG/LABS    Results for orders placed or performed during the hospital encounter of 03/30/25   CBC WITH DIFFERENTIAL    Collection Time: 03/30/25  6:50 AM   Result Value Ref Range    WBC 6.7 4.8 - 10.8 K/uL    RBC 5.57 4.70 - 6.10 M/uL    Hemoglobin 16.7 14.0 - 18.0 g/dL    Hematocrit 48.6 42.0 - 52.0 %    MCV 87.3 81.4 - 97.8 fL    MCH 30.0 27.0 - 33.0 pg    MCHC 34.4 32.3 - 36.5 g/dL    RDW 38.6 35.9 - 50.0 fL    Platelet Count 206 164 - 446 K/uL    MPV 10.8 9.0 - 12.9 fL    Neutrophils-Polys 50.40 44.00 - 72.00 %    Lymphocytes 38.30 22.00 - 41.00 %    Monocytes 7.90 0.00 - 13.40 %    Eosinophils 2.40 0.00 - 6.90 %    Basophils 0.70 0.00 - 1.80 %    Immature Granulocytes 0.30 0.00 - 0.90 %    Nucleated RBC 0.00 0.00 - 0.20 /100 WBC    Neutrophils (Absolute) 3.36 1.82 - 7.42 K/uL    Lymphs (Absolute) 2.56 1.00 - 4.80 K/uL    Monos (Absolute) 0.53 0.00 - 0.85 K/uL    Eos (Absolute) 0.16 0.00 - 0.51 K/uL    Baso (Absolute) 0.05 0.00 - 0.12 K/uL    Immature Granulocytes (abs) 0.02 0.00 - 0.11 K/uL    NRBC (Absolute) 0.00 K/uL   COMP METABOLIC PANEL    Collection Time: 03/30/25  6:50 AM   Result Value Ref Range    Sodium 139 135 - 145 mmol/L    Potassium 4.0 3.6 - 5.5 mmol/L    Chloride 104 96 " - 112 mmol/L    Co2 22 20 - 33 mmol/L    Anion Gap 13.0 7.0 - 16.0    Glucose 99 65 - 99 mg/dL    Bun 14 8 - 22 mg/dL    Creatinine 1.12 0.50 - 1.40 mg/dL    Calcium 9.7 8.5 - 10.5 mg/dL    Correct Calcium 9.2 8.5 - 10.5 mg/dL    AST(SGOT) 30 12 - 45 U/L    ALT(SGPT) 61 (H) 2 - 50 U/L    Alkaline Phosphatase 80 30 - 99 U/L    Total Bilirubin 0.5 0.1 - 1.5 mg/dL    Albumin 4.6 3.2 - 4.9 g/dL    Total Protein 7.3 6.0 - 8.2 g/dL    Globulin 2.7 1.9 - 3.5 g/dL    A-G Ratio 1.7 g/dL   LIPASE    Collection Time: 03/30/25  6:50 AM   Result Value Ref Range    Lipase 32 11 - 82 U/L   ESTIMATED GFR    Collection Time: 03/30/25  6:50 AM   Result Value Ref Range    GFR (CKD-EPI) 83 >60 mL/min/1.73 m 2   URINALYSIS CULTURE, IF INDICATED    Collection Time: 03/30/25  8:10 AM    Specimen: Urine, Clean Catch   Result Value Ref Range    Color Yellow     Character Clear     Specific Gravity 1.025 <1.035    Ph 6.0 5.0 - 8.0    Glucose Negative Negative mg/dL    Ketones Negative Negative mg/dL    Protein Negative Negative mg/dL    Bilirubin Negative Negative    Urobilinogen, Urine 0.2 <=1.0 EU/dL    Nitrite Negative Negative    Leukocyte Esterase Negative Negative    Occult Blood Small (A) Negative    Micro Urine Req Microscopic    URINE MICROSCOPIC (W/UA)    Collection Time: 03/30/25  8:10 AM   Result Value Ref Range    WBC 0-2 /hpf    RBC 6-10 (A) /hpf    Bacteria None None /hpf    Epithelial Cells 0-2 0 - 5 /hpf    Mucous Threads Present /hpf    Urine Casts 0-2 0 - 2 /lpf        I have independently interpreted this EKG    RADIOLOGY/PROCEDURES   I have independently interpreted the diagnostic imaging associated with this visit and am waiting the final reading from the radiologist.   My preliminary interpretation is as follows: CT agree with radiologist results.    Radiologist interpretation:  CT-RENAL COLIC EVALUATION(A/P W/O)   Final Result         1. Moderate left hydronephrosis related to a 6 x 4 mm distal left ureter stone near  the bladder. Single additional nonobstructing left kidney stone.      2. Early aortic vascular calcification indicating atherosclerosis noted.          COURSE & MEDICAL DECISION MAKING    ASSESSMENT, COURSE AND PLAN  Care Narrative:     44-year-old male presents the emergency department complaining of abrupt onset of left flank pain in the early morning hours.  Patient seen and examined writhing in pain and a broad differential diagnosis was considered.  This includes was not limited to kidney stone, pyelonephritis, infected kidney stone, perforation, volvulus, colitis, diverticulitis, or testicular pathology.    IV is established is given morphine Zofran and Toradol for his discomfort labs and a CT scan are ordered.  Few moments after pain medicines reassessed is feeling much better.    Hydration: Based on the patient's presentation of Inability to take oral fluids the patient was given IV fluids. IV Hydration was used because oral hydration was not adequate alone. Upon recheck following hydration, the patient was improved..      The patient had a hard time voiding his bladder was quite empty on CTs have ordered some IV fluids.  Hydrate and continue to observe him pending his CT results and reassess him for ongoing discomfort.      Return of his pain and required second dose of morphine    Observed here in the ED and ultimately feels better would like to go home.  His pain is resolved.  The patient has renal colic.  Other findings were discussed.  He will have his ALT rechecked by his doctor.  Aware of the other stone in his kidney.  He is provided a strainer and a urine cup.  He will strain his urine time to collect the stone.  He is prescribed Norco for breakthrough pain.  He is also advised take ibuprofen for pain.  Prescribed Flomax as well.  He is referred to urology as a follow-up    Return to the ED for pain, if he has a fever, vomiting, difficulty eating or other concerns.  Questions were answered.  He is  agreeable to plan he is discharged in good condition.  He is not febrile ill or toxic at this time      In prescribing controlled substances to this patient, I certify that I have obtained and reviewed the medical history of Rui Garcia. I have also made a good janel effort to obtain applicable records from other providers who have treated the patient and records did not demonstrate any increased risk of substance abuse that would prevent me from prescribing controlled substances.     I have conducted a physical exam and documented it. I have reviewed Mr. Garcia’s prescription history as maintained by the [place] Prescription Monitoring Program.     I have assessed the patient’s risk for abuse, dependency, and addiction using the validated Opioid Risk Tool available at https://www.mdcalc.com/epowco-npgx-wcyp-ort-narcotic-abuse.     Given the above, I believe the benefits of controlled substance therapy outweigh the risks. The reasons for prescribing controlled substances include non-narcotic, oral analgesic alternatives have been inadequate for pain control. Accordingly, I have discussed the risk and benefits, treatment plan, and alternative therapies with the patient.     Narcotic pain medication use and the risks.  Advised not to drive.    ADDITIONAL PROBLEMS MANAGED  History of renal colic    DISPOSITION AND DISCUSSIONS      Solis Rodriguez M.D.  [address] [facility] [address]  [facility] [address]  [phone]          Moisés BEARD M.D.  [address]  [facility] [address]  [phone]    Schedule an appointment as soon as possible for a visit in 3 days              FINAL DIAGNOSIS  1. Renal colic         Electronically signed by: Keith Bunch M.D., 3/30/2025 6:54 AM

## 2025-03-30 NOTE — ED NOTES
Medication history reviewed with pt. Med rec is complete.  Allergies reviewed, per pt  Interviewed pt with wife at bedside with permission from pt.    Pt reports no vitamins in the last 30 days or longer.    Patient has not had any outpatient antibiotics in the last 30 days.    Pt is not on any anticoagulants      Dispense history available in EPIC? NO

## 2025-04-07 NOTE — Clinical Note
REFERRAL APPROVAL NOTICE         Sent on April 7, 2025                   Rui Garcia  [address] [place] [address]                   Dear Mr. Garcia,    After a careful review of the medical information and benefit coverage, [facility] has processed your referral. See below for additional details.    If applicable, you must be actively enrolled with your insurance for coverage of the authorized service. If you have any questions regarding your coverage, please contact your insurance directly.    REFERRAL INFORMATION   Referral #:  [id number]  Referred-To Provider    Referred-By Provider:  Urology    Keith Bnuch M.D.   UROLOGY [address] Emergency Room  [address]  [place] [address]  [phone] [address]  [place] [address]  [phone]    Referral Start Date:  03/30/2025  Referral End Date:   03/30/2026             SCHEDULING  If you do not already have an appointment, please call [phone] to make an appointment.     MORE INFORMATION  If you do not already have a [facility] account, sign up at: [email or web address]  You can access your medical information, make appointments, see lab results, billing information, and more.  If you have questions regarding this referral, please contact  the [facility] Referrals department at:             [phone]. Monday - Friday 8:00AM - 5:00PM.     Sincerely,    [facility]